# Patient Record
Sex: MALE | HISPANIC OR LATINO | Employment: STUDENT | URBAN - METROPOLITAN AREA
[De-identification: names, ages, dates, MRNs, and addresses within clinical notes are randomized per-mention and may not be internally consistent; named-entity substitution may affect disease eponyms.]

---
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
## 2024-11-14 ENCOUNTER — HOSPITAL ENCOUNTER (EMERGENCY)
Facility: CLINIC | Age: 19
Discharge: HOME | End: 2024-11-14
Attending: EMERGENCY MEDICINE | Admitting: EMERGENCY MEDICINE
Payer: COMMERCIAL

## 2024-11-14 VITALS
OXYGEN SATURATION: 98 % | HEART RATE: 88 BPM | SYSTOLIC BLOOD PRESSURE: 145 MMHG | TEMPERATURE: 98.7 F | DIASTOLIC BLOOD PRESSURE: 85 MMHG | RESPIRATION RATE: 16 BRPM

## 2024-11-14 DIAGNOSIS — H60.391 INFECTIVE OTITIS EXTERNA, RIGHT: ICD-10-CM

## 2024-11-14 PROCEDURE — 99283 EMERGENCY DEPT VISIT LOW MDM: CPT | Performed by: EMERGENCY MEDICINE

## 2024-11-14 PROCEDURE — 250N000013 HC RX MED GY IP 250 OP 250 PS 637: Performed by: EMERGENCY MEDICINE

## 2024-11-14 RX ORDER — CIPROFLOXACIN AND DEXAMETHASONE 3; 1 MG/ML; MG/ML
4 SUSPENSION/ DROPS AURICULAR (OTIC) 2 TIMES DAILY
Qty: 7.5 ML | Refills: 0 | Status: SHIPPED | OUTPATIENT
Start: 2024-11-14 | End: 2024-11-24

## 2024-11-14 RX ORDER — CIPROFLOXACIN AND DEXAMETHASONE 3; 1 MG/ML; MG/ML
4 SUSPENSION/ DROPS AURICULAR (OTIC) ONCE
Status: COMPLETED | OUTPATIENT
Start: 2024-11-14 | End: 2024-11-14

## 2024-11-14 RX ADMIN — CIPROFLOXACIN AND DEXAMETHASONE 4 DROP: 3; 1 SUSPENSION/ DROPS AURICULAR (OTIC) at 16:26

## 2024-11-14 ASSESSMENT — COLUMBIA-SUICIDE SEVERITY RATING SCALE - C-SSRS
6. HAVE YOU EVER DONE ANYTHING, STARTED TO DO ANYTHING, OR PREPARED TO DO ANYTHING TO END YOUR LIFE?: NO
2. HAVE YOU ACTUALLY HAD ANY THOUGHTS OF KILLING YOURSELF IN THE PAST MONTH?: NO
1. IN THE PAST MONTH, HAVE YOU WISHED YOU WERE DEAD OR WISHED YOU COULD GO TO SLEEP AND NOT WAKE UP?: NO

## 2024-11-14 ASSESSMENT — ACTIVITIES OF DAILY LIVING (ADL): ADLS_ACUITY_SCORE: 0

## 2024-11-14 NOTE — DISCHARGE INSTRUCTIONS
You have an infection of the ear canal -- please use the ear drop antibiotics approximately every 12 hours x 10 days.     Please call today to schedule a follow-up appointment with your primary medical doctor in 3-5 days for reevaluation, which is important after today's emergency department visit.     Please make an appointment to follow up with [facility] (phone: [phone]) in 7 days if not improving.     Please return to emergency department for fever>104F, worsening ear pain, or any concerning symptoms

## 2024-11-14 NOTE — ED TRIAGE NOTES
PT arrives ambulatory stating he had been taking OTC ear drops for 4 days followed by a flushing on the 4th day. Pt states his L ear is great however, he has no hearing in the R ear and the feeling of fullness. Denies dizziness, nausea.     Triage Assessment (Adult)       Row Name 11/14/24 1534          Triage Assessment    Airway WDL WDL        Respiratory WDL    Respiratory WDL WDL        Skin Circulation/Temperature WDL    Skin Circulation/Temperature WDL WDL        Cardiac WDL    Cardiac WDL WDL        Peripheral/Neurovascular WDL    Peripheral Neurovascular WDL WDL        Cognitive/Neuro/Behavioral WDL    Cognitive/Neuro/Behavioral WDL WDL

## 2024-11-14 NOTE — ED PROVIDER NOTES
[facility] EMERGENCY DEPARTMENT ([facility])    11/14/24       ED PROVIDER NOTE   Vertical Triage B   History     Chief Complaint   Patient presents with    Ear Fullness     The history is provided by the patient and medical records.     Maximiliano Guillory is a 19 previously healthy male who started using over-the-counter earwax removing drops in bilateral ears after a physician told him that he had a lot of wax in both ears. Gradually developed right ear pain and mild tenderness/fullness. Denies cold symptoms, fevers sore throat, or any other symptoms.    This part of the medical record was transcribed by Adama Parker, Medical Scribe, from a dictation done by Gurpreet Armstrong MD.     Physical Exam   BP: (!) 145/85  Pulse: 88  Temp: 98.7  F (37.1  C)  Resp: 16  SpO2: 98 %    Physical Exam  Overall well-appearing left ear with normal TM and canal, right ear with edematous red canal with fluid behind his TM. No trismus. Breathing comfortably.    ED Course, Procedures, & Data      Procedures          No results found for any visits on 11/14/24.  Medications   ciprofloxacin-dexAMETHasone (CIPRODEX) 0.3-0.1 % otic suspension 4 drop (4 drops Right Ear $Given 11/14/24 1626)     Labs Ordered and Resulted from Time of ED Arrival to Time of ED Departure - No data to display  No orders to display          Critical care was not performed.     Medical Decision Making  The patient's presentation was of moderate complexity (an undiagnosed new problem with uncertain prognosis).    The patient's evaluation involved:  history and exam without other MDM data elements    The patient's management necessitated moderate risk (prescription drug management including medications given in the ED).    Assessment & Plan    Otitis media of right ear canal. Will treat with Ciprodex x 10 days, provide follow-up with ear nose and throat if necessary, return precautions, and discharge.        I have reviewed the nursing notes. I have  reviewed the findings, diagnosis, plan and need for follow up with the patient.    Discharge Medication List as of 11/14/2024  4:23 PM        START taking these medications    Details   ciprofloxacin-dexAMETHasone (CIPRODEX) 0.3-0.1 % otic suspension Place 4 drops into the right ear 2 times daily for 10 days., Disp-7.5 mL, R-0, E-Prescribe             Final diagnoses:   Infective otitis externa, right       Gurpreet Armstrong MD   [facility] EMERGENCY DEPARTMENT  11/14/2024        Gurpreet Armstrong MD  11/14/24 8477